# Patient Record
Sex: FEMALE | Race: WHITE | NOT HISPANIC OR LATINO | Employment: OTHER | URBAN - NONMETROPOLITAN AREA
[De-identification: names, ages, dates, MRNs, and addresses within clinical notes are randomized per-mention and may not be internally consistent; named-entity substitution may affect disease eponyms.]

---

## 2017-01-19 ENCOUNTER — OFFICE VISIT (OUTPATIENT)
Dept: GASTROENTEROLOGY | Facility: CLINIC | Age: 47
End: 2017-01-19

## 2017-01-19 VITALS
DIASTOLIC BLOOD PRESSURE: 71 MMHG | BODY MASS INDEX: 21.79 KG/M2 | WEIGHT: 111 LBS | HEIGHT: 60 IN | SYSTOLIC BLOOD PRESSURE: 96 MMHG | HEART RATE: 76 BPM

## 2017-01-19 DIAGNOSIS — R10.32 LEFT LOWER QUADRANT PAIN: ICD-10-CM

## 2017-01-19 DIAGNOSIS — R10.814 LEFT LOWER QUADRANT ABDOMINAL TENDERNESS WITHOUT REBOUND TENDERNESS: ICD-10-CM

## 2017-01-19 DIAGNOSIS — K21.9 GASTROESOPHAGEAL REFLUX DISEASE, ESOPHAGITIS PRESENCE NOT SPECIFIED: Primary | ICD-10-CM

## 2017-01-19 DIAGNOSIS — R12 HEARTBURN: ICD-10-CM

## 2017-01-19 DIAGNOSIS — R10.31 RIGHT LOWER QUADRANT ABDOMINAL PAIN: ICD-10-CM

## 2017-01-19 DIAGNOSIS — R10.813 RIGHT LOWER QUADRANT ABDOMINAL TENDERNESS WITHOUT REBOUND TENDERNESS: ICD-10-CM

## 2017-01-19 DIAGNOSIS — R19.7 DIARRHEA, UNSPECIFIED TYPE: ICD-10-CM

## 2017-01-19 PROCEDURE — 99204 OFFICE O/P NEW MOD 45 MIN: CPT | Performed by: INTERNAL MEDICINE

## 2017-01-19 RX ORDER — ESCITALOPRAM OXALATE 20 MG/1
TABLET ORAL
Refills: 1 | COMMUNITY
Start: 2017-01-13

## 2017-01-19 RX ORDER — OMEPRAZOLE 40 MG/1
CAPSULE, DELAYED RELEASE ORAL
Qty: 30 CAPSULE | Refills: 5 | Status: SHIPPED | OUTPATIENT
Start: 2017-01-19 | End: 2017-02-23 | Stop reason: SINTOL

## 2017-01-19 RX ORDER — ASPIRIN 81 MG/1
81 TABLET ORAL DAILY
COMMUNITY

## 2017-01-19 RX ORDER — LEVOTHYROXINE SODIUM 0.12 MG/1
TABLET ORAL
Refills: 5 | COMMUNITY
Start: 2016-12-20

## 2017-01-19 RX ORDER — ONDANSETRON 4 MG/1
TABLET, FILM COATED ORAL
Qty: 5 TABLET | Refills: 0 | Status: SHIPPED | OUTPATIENT
Start: 2017-01-19 | End: 2017-02-23

## 2017-01-19 RX ORDER — POLYETHYLENE GLYCOL 3350 17 G/17G
POWDER, FOR SOLUTION ORAL
Qty: 510 G | Refills: 0 | Status: SHIPPED | OUTPATIENT
Start: 2017-01-19

## 2017-01-19 RX ORDER — QUETIAPINE FUMARATE 50 MG/1
TABLET, FILM COATED ORAL
Refills: 1 | COMMUNITY
Start: 2016-11-21

## 2017-01-19 RX ORDER — CLONAZEPAM 0.5 MG/1
TABLET ORAL
Refills: 1 | COMMUNITY
Start: 2016-12-28

## 2017-01-19 RX ORDER — RANITIDINE 300 MG/1
TABLET ORAL
Refills: 5 | COMMUNITY
Start: 2016-12-20

## 2017-01-19 RX ORDER — ROSUVASTATIN CALCIUM 20 MG/1
TABLET, COATED ORAL
Refills: 5 | COMMUNITY
Start: 2016-12-20

## 2017-01-19 NOTE — PATIENT INSTRUCTIONS
LANDEN MCDONOUGH D.O.      UPPER ENDOSCOPY INSTRUCTIONS     ON THE DAY OF EXAMINATION:  1.) Do not eat anything after MIDNIGHT the night before.  2.)  You are permitted to have clear liquids until FOUR hours prior to procedure.  3.) You MUST have a  to transport you the day of the procedure.  4.) If you take medicine prescribed by your primary care physician, you may take your blood pressure medication with a small amount of water prior to your procedure. Bring your morning medication with you and you may take them after you have completed the procedure.   5.) Do not wear lipstick, earrings, hair sprays, or hair oils on the day of the exam.     POST PROCEDURE INSTRUCTIONS:  1.) If you experience any of the following symptoms:   • Abdominal Pain  • Fever  • Vomiting  • Blood from rectum  • Chest Pain    Please contact the office immediately at [phone]. If it after normal business hours contact the  at [facility] at [phone], and they will contact Dr. Mcdonough.     The procedure findings will be reviewed and discussed during your follow-up visit.            LANDEN MCDONOUGH D.O.  Board Certified in Gastroenterology    Miralax Colonoscopy One Day Prep    Do these things 7 DAYS BEFORE the procedure:  • Arrange a ride: You will be given medicine that makes you relax and be sleepy, so you cannot drive a car or take a bus home. If you arrive without an escort, your procedure may need to be rescheduled.   • Stop taking Iron and Vitamin E  • If you are taking Coumadin, Plavix, and/or Warfarin or if you are a diabetic, call your doctor for special instructions.  • Do not eat any seeds, popcorn or nuts.  • Please inform the physician of any history of heart murmurs, valve replacement, heart or lung conditions, or if you have had any adverse reactions to anesthesia.    Do these things 3 DAYS BEFORE the procedure:  • Confirm your ride.  • If you need to cancel your appointment,  call your doctor.   • Review the diet you need to follow for the next two days. Plan your meals according to the clear liquid diet.    Do these things 1 DAY BEFORE the procedure:  • Beginning at breakfast and lasting until midnight, start a clear liquid diet, a clear liquid diet can be found below.  • It is very important that you increase your fluids throughout the day.  • At approximately 8:00 AM take 4 Dulcolax (bisacodyl) tablets with 8 ounces of water.   • 8:00 PM - Mix 32 ounces of a pre-warmed liquid of your choice, such as lemon flavored Crystal Light, apple juice, or Gatorade (except red, blue, green, or purple) with 15 capfuls of MiraLax. Mix well until the MiraLax has dissolved. DO NOT REFRIGERATE (refrigeration will cause the MiraLax to not dissolve completely.) You may pour each glass over ice after the MiraLax has been dissolved if you prefer to drink it cold. Drink entire mixture in 1-3 hours.     Do these things ON THE DAY OF the procedure:   • 6 hours prior to arrival mix 15 capfuls with 32 ounces of liquid and drink entire mixture in 1-3 hours.  • You may take any essential medications with a small amount of water, otherwise do not eat or drink anything on the morning of your procedure. Please contact your Primary Care Physician for the insulin dose, if applicable.  • Once again, it is very important for you to bring someone with you at the time of your procedure to drive you home.  • Nothing to eat or drink 4 hours prior to procedure.    **REMINDERS**  **Please note: If you experience nausea, you may take Phenergan and/or Zofran. 1 tablet every 4-6 hours as needed.   ** Before presenting for your EGD or Colonoscopy you will need to have a shower/bath.         Clear Liquid Diet  This diet provides fluids that leave little residue and are easily absorbed with minimal digestive activity. This diet is inadequate in all essential nutrients and is recommended only if clear liquids are temporarily  needed. No red, purple, blue or green liquids should be consumed.    FOOD GROUP FOODS ALLOWED FOODS TO AVOID   Milk & Beverages  No red or purple liquids! Tea, coffee, carbonated or fruit flavored drinks Milk, milk drinks   Meat & Meat substances None All   Vegetables None All   Fruits & Fruit Juices Strained fruit juices: apple, white grape, lemonade Fruit juices with fruit pulp   Grains & Starches None All   Soups Clear broth, consommé, Beef, chicken, vegetable broth All others   Desserts Clear flavored gelatin or popsicles. No red, purple, blue and green.  All others   Fats None All   Miscellaneous Sugar, honey, syrup, clear hard candy, salt All others       Breakfast Lunch Dinner   4 oz White grape juice 4 oz Apple juice 4 oz Lemonade   6 oz clear broth 6 oz Clear broth 6 oz Clear Broth   Jell-O* Jell-O* Jell-O*   Tea or Coffee Tea or Coffee Tea or Coffee   *Plain only, no fruit or toppings.    Date of procedure: 02/01/2017                Time of Arrival: 1000 AM

## 2017-01-19 NOTE — MR AVS SNAPSHOT
Anayeli Dang   1/19/2017 4:10 PM   Office Visit    Dept Phone:  [phone]   Encounter #:  [id number]    Provider:  Caroline Mcdonough DO   Department:  [facility] GASTROENTEROLOGY                Your Full Care Plan              Today's Medication Changes          These changes are accurate as of: 1/19/17  5:29 PM.  If you have any questions, ask your nurse or doctor.               New Medication(s)Ordered:     bisacodyl 5 MG EC tablet   Commonly known as:  DULCOLAX   Take 4 tablets at 8am with a full glass of water.   Started by:  Caroline Mcdonough, DO       omeprazole 40 MG capsule   Commonly known as:  priLOSEC   Take 40mg tab PO once daily with a meal.   Started by:  Caroline Mcdonough, DO       ondansetron 4 MG tablet   Commonly known as:  ZOFRAN   Take 1 tablet PRN nausea every 4 hours.   Started by:  Caroline Mcdonough,        polyethylene glycol packet   Commonly known as:  MIRALAX   Take 255g of Miralax with 32 oz clear liquid at 8pm the night before the procedure. Repeat 255g Miralax 6 hrs prior to your procedure.   Started by:  Caroline Mcdonough DO            Where to Get Your Medications      [facility] [place] [facility] [address] - [phone]  - [phone] FX  [address], [place] [address]     Phone:  [phone]     bisacodyl 5 MG EC tablet    omeprazole 40 MG capsule    ondansetron 4 MG tablet    polyethylene glycol packet                  Your Updated Medication List          This list is accurate as of: 1/19/17  5:29 PM.  Always use your most recent med list.                aspirin 81 MG EC tablet       bisacodyl 5 MG EC tablet   Commonly known as:  DULCOLAX   Take 4 tablets at 8am with a full glass of water.       clonazePAM 0.5 MG tablet   Commonly known as:  KlonoPIN       escitalopram 20 MG tablet   Commonly known as:  LEXAPRO       levothyroxine 125 MCG  tablet   Commonly known as:  SYNTHROID, LEVOTHROID       omeprazole 40 MG capsule   Commonly known as:  priLOSEC   Take 40mg tab PO once daily with a meal.       ondansetron 4 MG tablet   Commonly known as:  ZOFRAN   Take 1 tablet PRN nausea every 4 hours.       polyethylene glycol packet   Commonly known as:  MIRALAX   Take 255g of Miralax with 32 oz clear liquid at 8pm the night before the procedure. Repeat 255g Miralax 6 hrs prior to your procedure.       QUEtiapine 50 MG tablet   Commonly known as:  SEROquel       raNITIdine 300 MG tablet   Commonly known as:  ZANTAC       rosuvastatin 20 MG tablet   Commonly known as:  CRESTOR               Instructions                 LANDEN MCDONOUGH D.O.      UPPER ENDOSCOPY INSTRUCTIONS     ON THE DAY OF EXAMINATION:  1.) Do not eat anything after MIDNIGHT the night before.  2.)  You are permitted to have clear liquids until FOUR hours prior to procedure.  3.) You MUST have a  to transport you the day of the procedure.  4.) If you take medicine prescribed by your primary care physician, you may take your blood pressure medication with a small amount of water prior to your procedure. Bring your morning medication with you and you may take them after you have completed the procedure.   5.) Do not wear lipstick, earrings, hair sprays, or hair oils on the day of the exam.     POST PROCEDURE INSTRUCTIONS:  1.) If you experience any of the following symptoms:   • Abdominal Pain  • Fever  • Vomiting  • Blood from rectum  • Chest Pain    Please contact the office immediately at [phone]. If it after normal business hours contact the  at [facility] at [phone], and they will contact Dr. Mcdonough.     The procedure findings will be reviewed and discussed during your follow-up visit.            LANDEN MCDONOUGH D.O.  Board Certified in Gastroenterology    Miralax Colonoscopy One Day Prep    Do these things 7 DAYS BEFORE the procedure:  • Arrange a  ride: You will be given medicine that makes you relax and be sleepy, so you cannot drive a car or take a bus home. If you arrive without an escort, your procedure may need to be rescheduled.   • Stop taking Iron and Vitamin E  • If you are taking Coumadin, Plavix, and/or Warfarin or if you are a diabetic, call your doctor for special instructions.  • Do not eat any seeds, popcorn or nuts.  • Please inform the physician of any history of heart murmurs, valve replacement, heart or lung conditions, or if you have had any adverse reactions to anesthesia.    Do these things 3 DAYS BEFORE the procedure:  • Confirm your ride.  • If you need to cancel your appointment, call your doctor.   • Review the diet you need to follow for the next two days. Plan your meals according to the clear liquid diet.    Do these things 1 DAY BEFORE the procedure:  • Beginning at breakfast and lasting until midnight, start a clear liquid diet, a clear liquid diet can be found below.  • It is very important that you increase your fluids throughout the day.  • At approximately 8:00 AM take 4 Dulcolax (bisacodyl) tablets with 8 ounces of water.   • 8:00 PM - Mix 32 ounces of a pre-warmed liquid of your choice, such as lemon flavored Crystal Light, apple juice, or Gatorade (except red, blue, green, or purple) with 15 capfuls of MiraLax. Mix well until the MiraLax has dissolved. DO NOT REFRIGERATE (refrigeration will cause the MiraLax to not dissolve completely.) You may pour each glass over ice after the MiraLax has been dissolved if you prefer to drink it cold. Drink entire mixture in 1-3 hours.     Do these things ON THE DAY OF the procedure:   • 6 hours prior to arrival mix 15 capfuls with 32 ounces of liquid and drink entire mixture in 1-3 hours.  • You may take any essential medications with a small amount of water, otherwise do not eat or drink anything on the morning of your procedure. Please contact your Primary Care Physician for the  insulin dose, if applicable.  • Once again, it is very important for you to bring someone with you at the time of your procedure to drive you home.  • Nothing to eat or drink 4 hours prior to procedure.    **REMINDERS**  **Please note: If you experience nausea, you may take Phenergan and/or Zofran. 1 tablet every 4-6 hours as needed.   ** Before presenting for your EGD or Colonoscopy you will need to have a shower/bath.         Clear Liquid Diet  This diet provides fluids that leave little residue and are easily absorbed with minimal digestive activity. This diet is inadequate in all essential nutrients and is recommended only if clear liquids are temporarily needed. No red, purple, blue or green liquids should be consumed.    FOOD GROUP FOODS ALLOWED FOODS TO AVOID   Milk & Beverages  No red or purple liquids! Tea, coffee, carbonated or fruit flavored drinks Milk, milk drinks   Meat & Meat substances None All   Vegetables None All   Fruits & Fruit Juices Strained fruit juices: apple, white grape, lemonade Fruit juices with fruit pulp   Grains & Starches None All   Soups Clear broth, consommé, Beef, chicken, vegetable broth All others   Desserts Clear flavored gelatin or popsicles. No red, purple, blue and green.  All others   Fats None All   Miscellaneous Sugar, honey, syrup, clear hard candy, salt All others       Breakfast Lunch Dinner   4 oz White grape juice 4 oz Apple juice 4 oz Lemonade   6 oz clear broth 6 oz Clear broth 6 oz Clear Broth   Jell-O* Jell-O* Jell-O*   Tea or Coffee Tea or Coffee Tea or Coffee   *Plain only, no fruit or toppings.    Date of procedure: 02/01/2017                Time of Arrival: 1000 AM         Patient Instructions History      Upcoming Appointments     Visit Type Date Time Department    NEW PATIENT 1/19/2017  4:10 PM [facility]    FOLLOW UP 2/23/2017  2:40 PM [facility]      MyChart Signup     [facility] allows you to send messages to your doctor,  "view your test results, renew your prescriptions, schedule appointments, and more. To sign up, go to [employer] and click on the Sign Up Now link in the New User? box. Enter your [employer] Activation Code exactly as it appears below along with the last four digits of your Social Security Number and your Date of Birth () to complete the sign-up process. If you do not sign up before the expiration date, you must request a new code.    [employer] Activation Code: [email or web address]  Expires: 2017  5:29 PM    If you have questions, you can email [email or web address] or call [phone] to talk to our [employer] staff. Remember, [employer] is NOT to be used for urgent needs. For medical emergencies, dial 911.               Other Info from Your Visit           Your Appointments     2017  2:40 PM EST   Follow Up with Becky Baires PA-C   [facility] (--)    [address] [place] [id number] [phone]           Arrive 15 minutes prior to appointment.              Allergies     No Known Allergies      Reason for Visit     Abdominal Pain           Vital Signs     Blood Pressure Pulse Height Weight Body Mass Index Smoking Status    96/71 76 60\" (152.4 cm) 111 lb (50.3 kg) 21.68 kg/m2 Current Every Day Smoker        "

## 2017-01-19 NOTE — PROGRESS NOTES
: 1970    Chief Complaint   Patient presents with   • Abdominal Pain       Anayeli Dang is a 46 y.o. female who presents to the office today as a consultation from Lexx Willson MD for evaluation of Abdominal Pain    History of Present Illness:    Ms. Anayeli Dang is a 46-year-old female who was seen in the office today in consultation at the request of Dr. Pepe Willson for evaluation of abdominal pain.  She reports that she has right lower quadrant abdominal pain that occurs daily and will occur in the evenings after eating even if it only Jell-O.  She rarely has pain in the morning.  She describes the pain as mild to moderate.  She denies any radiation the pain.  On 2016 she was seen in the emergency department for right upper quadrant abdominal pain.  Laboratory values were reviewed; CMP revealed BUN 20, lipase 209, CBC within normal limits urinalysis,  2+ leukocyte esterase.  CT abdomen and pelvis on 2016, revealed no acute abnormalities.  Stable prominence of the intrahepatic biliary system.  Nonobstructive right renal calculus.  She was treated with antibiotics for her urinary tract infection.  Thought she was advised to take all of her antibiotics at once.  After taking all of the antibiotics at one time she became sick and vomited. On , she had nausea with vomiting and copious diarrhea.  She thought she had a viral infection.  The nausea and vomiting have resolved.  She continues to have problems with abdominal pain.  She continues to have loose to diarrhea-like stools that vary in number from day to day.    She has never had a colonoscopy.  Family history reveals her father had colon cancer.    Review of Systems   Constitutional: Negative for appetite change, chills, fatigue, fever and unexpected weight change.   HENT: Negative for hearing loss, mouth sores and nosebleeds.    Eyes: Negative for itching and visual disturbance.   Respiratory: Negative for cough, chest  tightness, shortness of breath and wheezing.    Cardiovascular: Negative for chest pain, palpitations and leg swelling.   Endocrine: Negative for cold intolerance, heat intolerance, polydipsia and polyuria.   Genitourinary: Negative for dysuria, frequency and hematuria.   Musculoskeletal: Negative for arthralgias, joint swelling and myalgias.   Skin: Negative for rash and wound.   Allergic/Immunologic: Negative for food allergies and immunocompromised state.   Neurological: Negative for seizures, syncope, weakness and light-headedness.   Hematological: Negative for adenopathy. Does not bruise/bleed easily.   Psychiatric/Behavioral: Negative for confusion and sleep disturbance. The patient is nervous/anxious.    Gastrointestinal: Positive for abdominal pain, diarrhea and heartburn.    Past Medical History   Diagnosis Date   • Anxiety    • GERD (gastroesophageal reflux disease)    • Hyperlipidemia    • Thyroid disease        Past Surgical History   Procedure Laterality Date   • Breast surgery         Family History   Problem Relation Age of Onset   • Colon cancer Father    • No Known Problems Mother    • Heart attack Maternal Grandfather    • Cancer Paternal Grandmother    • Liver disease Neg Hx        History   Smoking Status   • Current Every Day Smoker   • Packs/day: 0.50   • Types: Cigarettes   Smokeless Tobacco   • Not on file     History   Alcohol Use No     History   Drug Use No     Marital Status: Legally       Current Outpatient Prescriptions:   •  aspirin 81 MG EC tablet, Take 81 mg by mouth Daily., Disp: , Rfl:   •  bisacodyl (DULCOLAX) 5 MG EC tablet, Take 4 tablets at 8am with a full glass of water., Disp: 4 tablet, Rfl: 0  •  clonazePAM (KlonoPIN) 0.5 MG tablet, , Disp: , Rfl: 1  •  escitalopram (LEXAPRO) 20 MG tablet, , Disp: , Rfl: 1  •  levothyroxine (SYNTHROID, LEVOTHROID) 125 MCG tablet, , Disp: , Rfl: 5  •  omeprazole (priLOSEC) 40 MG capsule, Take 40mg tab PO once daily with a meal.,  "Disp: 30 capsule, Rfl: 5  •  ondansetron (ZOFRAN) 4 MG tablet, Take 1 tablet PRN nausea every 4 hours., Disp: 5 tablet, Rfl: 0  •  polyethylene glycol (MIRALAX) packet, Take 255g of Miralax with 32 oz clear liquid at 8pm the night before the procedure. Repeat 255g Miralax 6 hrs prior to your procedure., Disp: 510 g, Rfl: 0  •  QUEtiapine (SEROquel) 50 MG tablet, , Disp: , Rfl: 1  •  raNITIdine (ZANTAC) 300 MG tablet, , Disp: , Rfl: 5  •  rosuvastatin (CRESTOR) 20 MG tablet, , Disp: , Rfl: 5    Allergies:   Review of patient's allergies indicates no known allergies.    Visit Vitals   • BP 96/71   • Pulse 76   • Ht 60\" (152.4 cm)   • Wt 111 lb (50.3 kg)   • BMI 21.68 kg/m2       Physical Exam   Constitutional: She is oriented to person, place, and time. She appears well-developed and well-nourished. No distress.   HENT:   Head: Normocephalic and atraumatic.   Right Ear: External ear normal.   Left Ear: External ear normal.   Nose: Nose normal.   Mouth/Throat: Oropharynx is clear and moist.   Eyes: Conjunctivae and EOM are normal. Right eye exhibits no discharge. Left eye exhibits no discharge. No scleral icterus.   Neck: Normal range of motion. Neck supple.   Cardiovascular: Normal rate, regular rhythm and normal heart sounds.  Exam reveals no gallop and no friction rub.    No murmur heard.  Pulmonary/Chest: Effort normal and breath sounds normal. No respiratory distress. She has no wheezes. She has no rales. She exhibits no tenderness.   Abdominal: Soft. Normal appearance and bowel sounds are normal. She exhibits no distension, no ascites and no mass. There is tenderness in the right lower quadrant and left lower quadrant. There is no rigidity and no guarding. No hernia.   Musculoskeletal: Normal range of motion. She exhibits no edema or deformity.   Neurological: She is alert and oriented to person, place, and time. She exhibits normal muscle tone. Coordination normal.   Skin: Skin is warm and dry. No rash noted. No " erythema. No pallor.        Psychiatric: She has a normal mood and affect. Her behavior is normal. Judgment and thought content normal.   Nursing note and vitals reviewed.      Assessment:  1. Gastroesophageal reflux disease, esophagitis presence not specified    2. Heartburn    3. Right lower quadrant abdominal pain    4. Left lower quadrant pain    5. Diarrhea, unspecified type    6. Right lower quadrant abdominal tenderness without rebound tenderness    7. Left lower quadrant abdominal tenderness without rebound tenderness        Plan:  · She will need an esophagogastroduodenoscopy and colonoscopy performed with IV general sedation. All of the risks, benefits and alternatives of these procedures have been discussed with her, all of her questions have been answered and she has elected to proceed. She should follow up in the office after these procedures to discuss the results and further recommendations can be made at that time.  · She was instructed not to lie down immediately after eating (wait at least 3 hours after meals), elevate the head of the bed at night, avoid spicy foods, avoid mints, avoid caffeine, avoid nicotine and maintain a healthy weight. She will start taking omeprazole 40mg once daily 30 minutes before meals.   · She will continue current medications.  · I discussed the patient's findings and my recommendations with the patient. All of their questions were answered to their satisfaction and they understand the plan.   · She will call with any interval concerns.       Return for next scheduled follow up after procedure.            Electronically signed by: Caroline Mcdonough D.O. 1/19/2017 at 4:30 PM.

## 2017-01-19 NOTE — LETTER
2017     Lexx Willson MD  [address]  [address]  [place] [address]    Patient: Anayeli Dang   YOB: 1970   Date of Visit: 2017       Dear Dr. Anamika MD:    Anayeli Dang was in my office today. Below is a copy of my note.    If you have questions, please do not hesitate to call me. I look forward to following Anayeli along with you.         Sincerely,        Caroline Mcdonough, DO        CC: No Recipients    : 1970    Chief Complaint   Patient presents with   • Abdominal Pain       Anayeli Dang is a 46 y.o. female who presents to the office today as a consultation from Lexx Willson MD for evaluation of Abdominal Pain    History of Present Illness:    Ms. Anayeli Dang is a 46-year-old female who was seen in the office today in consultation at the request of Dr. Pepe Willson for evaluation of abdominal pain.  She reports that she has right lower quadrant abdominal pain that occurs daily and will occur in the evenings after eating even if it only Jell-O.  She rarely has pain in the morning.  She describes the pain as mild to moderate.  She denies any radiation the pain.  On 2016 she was seen in the emergency department for right upper quadrant abdominal pain.  Laboratory values were reviewed; CMP revealed BUN 20, lipase 209, CBC within normal limits urinalysis,  2+ leukocyte esterase.  CT abdomen and pelvis on 2016, revealed no acute abnormalities.  Stable prominence of the intrahepatic biliary system.  Nonobstructive right renal calculus.  She was treated with antibiotics for her urinary tract infection.  Thought she was advised to take all of her antibiotics at once.  After taking all of the antibiotics at one time she became sick and vomited. On , she had nausea with vomiting and copious diarrhea.  She thought she had a viral infection.  The nausea and vomiting have resolved.  She continues to have problems with abdominal pain.  She  continues to have loose to diarrhea-like stools that vary in number from day to day.    She has never had a colonoscopy.  Family history reveals her father had colon cancer.    Review of Systems   Constitutional: Negative for appetite change, chills, fatigue, fever and unexpected weight change.   HENT: Negative for hearing loss, mouth sores and nosebleeds.    Eyes: Negative for itching and visual disturbance.   Respiratory: Negative for cough, chest tightness, shortness of breath and wheezing.    Cardiovascular: Negative for chest pain, palpitations and leg swelling.   Endocrine: Negative for cold intolerance, heat intolerance, polydipsia and polyuria.   Genitourinary: Negative for dysuria, frequency and hematuria.   Musculoskeletal: Negative for arthralgias, joint swelling and myalgias.   Skin: Negative for rash and wound.   Allergic/Immunologic: Negative for food allergies and immunocompromised state.   Neurological: Negative for seizures, syncope, weakness and light-headedness.   Hematological: Negative for adenopathy. Does not bruise/bleed easily.   Psychiatric/Behavioral: Negative for confusion and sleep disturbance. The patient is nervous/anxious.    Gastrointestinal: Positive for abdominal pain, diarrhea and heartburn.    Past Medical History   Diagnosis Date   • Anxiety    • GERD (gastroesophageal reflux disease)    • Hyperlipidemia    • Thyroid disease        Past Surgical History   Procedure Laterality Date   • Breast surgery         Family History   Problem Relation Age of Onset   • Colon cancer Father    • No Known Problems Mother    • Heart attack Maternal Grandfather    • Cancer Paternal Grandmother    • Liver disease Neg Hx        History   Smoking Status   • Current Every Day Smoker   • Packs/day: 0.50   • Types: Cigarettes   Smokeless Tobacco   • Not on file     History   Alcohol Use No     History   Drug Use No     Marital Status: Legally       Current Outpatient Prescriptions:   •   "aspirin 81 MG EC tablet, Take 81 mg by mouth Daily., Disp: , Rfl:   •  bisacodyl (DULCOLAX) 5 MG EC tablet, Take 4 tablets at 8am with a full glass of water., Disp: 4 tablet, Rfl: 0  •  clonazePAM (KlonoPIN) 0.5 MG tablet, , Disp: , Rfl: 1  •  escitalopram (LEXAPRO) 20 MG tablet, , Disp: , Rfl: 1  •  levothyroxine (SYNTHROID, LEVOTHROID) 125 MCG tablet, , Disp: , Rfl: 5  •  omeprazole (priLOSEC) 40 MG capsule, Take 40mg tab PO once daily with a meal., Disp: 30 capsule, Rfl: 5  •  ondansetron (ZOFRAN) 4 MG tablet, Take 1 tablet PRN nausea every 4 hours., Disp: 5 tablet, Rfl: 0  •  polyethylene glycol (MIRALAX) packet, Take 255g of Miralax with 32 oz clear liquid at 8pm the night before the procedure. Repeat 255g Miralax 6 hrs prior to your procedure., Disp: 510 g, Rfl: 0  •  QUEtiapine (SEROquel) 50 MG tablet, , Disp: , Rfl: 1  •  raNITIdine (ZANTAC) 300 MG tablet, , Disp: , Rfl: 5  •  rosuvastatin (CRESTOR) 20 MG tablet, , Disp: , Rfl: 5    Allergies:   Review of patient's allergies indicates no known allergies.    Visit Vitals   • BP 96/71   • Pulse 76   • Ht 60\" (152.4 cm)   • Wt 111 lb (50.3 kg)   • BMI 21.68 kg/m2       Physical Exam   Constitutional: She is oriented to person, place, and time. She appears well-developed and well-nourished. No distress.   HENT:   Head: Normocephalic and atraumatic.   Right Ear: External ear normal.   Left Ear: External ear normal.   Nose: Nose normal.   Mouth/Throat: Oropharynx is clear and moist.   Eyes: Conjunctivae and EOM are normal. Right eye exhibits no discharge. Left eye exhibits no discharge. No scleral icterus.   Neck: Normal range of motion. Neck supple.   Cardiovascular: Normal rate, regular rhythm and normal heart sounds.  Exam reveals no gallop and no friction rub.    No murmur heard.  Pulmonary/Chest: Effort normal and breath sounds normal. No respiratory distress. She has no wheezes. She has no rales. She exhibits no tenderness.   Abdominal: Soft. Normal " appearance and bowel sounds are normal. She exhibits no distension, no ascites and no mass. There is tenderness in the right lower quadrant and left lower quadrant. There is no rigidity and no guarding. No hernia.   Musculoskeletal: Normal range of motion. She exhibits no edema or deformity.   Neurological: She is alert and oriented to person, place, and time. She exhibits normal muscle tone. Coordination normal.   Skin: Skin is warm and dry. No rash noted. No erythema. No pallor.        Psychiatric: She has a normal mood and affect. Her behavior is normal. Judgment and thought content normal.   Nursing note and vitals reviewed.      Assessment:  1. Gastroesophageal reflux disease, esophagitis presence not specified    2. Heartburn    3. Right lower quadrant abdominal pain    4. Left lower quadrant pain    5. Diarrhea, unspecified type    6. Right lower quadrant abdominal tenderness without rebound tenderness    7. Left lower quadrant abdominal tenderness without rebound tenderness        Plan:  · She will need an esophagogastroduodenoscopy and colonoscopy performed with IV general sedation. All of the risks, benefits and alternatives of these procedures have been discussed with her, all of her questions have been answered and she has elected to proceed. She should follow up in the office after these procedures to discuss the results and further recommendations can be made at that time.  · She was instructed not to lie down immediately after eating (wait at least 3 hours after meals), elevate the head of the bed at night, avoid spicy foods, avoid mints, avoid caffeine, avoid nicotine and maintain a healthy weight. She will start taking omeprazole 40mg once daily 30 minutes before meals.   · She will continue current medications.  · I discussed the patient's findings and my recommendations with the patient. All of their questions were answered to their satisfaction and they understand the plan.   · She will call with  any interval concerns.       Return for next scheduled follow up after procedure.            Electronically signed by: Caroline Mcdonough D.O. 1/19/2017 at 4:30 PM.

## 2017-02-01 ENCOUNTER — ANESTHESIA (OUTPATIENT)
Dept: PERIOP | Facility: HOSPITAL | Age: 47
End: 2017-02-01

## 2017-02-01 ENCOUNTER — ANESTHESIA EVENT (OUTPATIENT)
Dept: PERIOP | Facility: HOSPITAL | Age: 47
End: 2017-02-01

## 2017-02-01 ENCOUNTER — HOSPITAL ENCOUNTER (OUTPATIENT)
Facility: HOSPITAL | Age: 47
Setting detail: HOSPITAL OUTPATIENT SURGERY
Discharge: HOME | End: 2017-02-01
Attending: INTERNAL MEDICINE | Admitting: INTERNAL MEDICINE

## 2017-02-01 VITALS
OXYGEN SATURATION: 95 % | HEIGHT: 60 IN | TEMPERATURE: 97.1 F | DIASTOLIC BLOOD PRESSURE: 77 MMHG | WEIGHT: 111 LBS | RESPIRATION RATE: 20 BRPM | SYSTOLIC BLOOD PRESSURE: 105 MMHG | HEART RATE: 81 BPM | BODY MASS INDEX: 21.79 KG/M2

## 2017-02-01 DIAGNOSIS — K21.9 GASTROESOPHAGEAL REFLUX DISEASE, ESOPHAGITIS PRESENCE NOT SPECIFIED: ICD-10-CM

## 2017-02-01 DIAGNOSIS — R12 HEARTBURN: ICD-10-CM

## 2017-02-01 DIAGNOSIS — R10.12 LEFT UPPER QUADRANT PAIN: ICD-10-CM

## 2017-02-01 DIAGNOSIS — R13.10 DYSPHAGIA, UNSPECIFIED TYPE: ICD-10-CM

## 2017-02-01 DIAGNOSIS — R63.0 ANOREXIA: ICD-10-CM

## 2017-02-01 PROCEDURE — 25010000002 PROPOFOL 1000 MG/ML EMULSION: Performed by: NURSE ANESTHETIST, CERTIFIED REGISTERED

## 2017-02-01 PROCEDURE — 43239 EGD BIOPSY SINGLE/MULTIPLE: CPT | Performed by: INTERNAL MEDICINE

## 2017-02-01 PROCEDURE — 45380 COLONOSCOPY AND BIOPSY: CPT | Performed by: INTERNAL MEDICINE

## 2017-02-01 PROCEDURE — 25010000002 FENTANYL CITRATE (PF) 100 MCG/2ML SOLUTION: Performed by: NURSE ANESTHETIST, CERTIFIED REGISTERED

## 2017-02-01 PROCEDURE — 25010000002 MIDAZOLAM PER 1 MG: Performed by: NURSE ANESTHETIST, CERTIFIED REGISTERED

## 2017-02-01 PROCEDURE — 88305 TISSUE EXAM BY PATHOLOGIST: CPT | Performed by: INTERNAL MEDICINE

## 2017-02-01 RX ORDER — LIDOCAINE HYDROCHLORIDE 20 MG/ML
INJECTION, SOLUTION INFILTRATION; PERINEURAL AS NEEDED
Status: DISCONTINUED | OUTPATIENT
Start: 2017-02-01 | End: 2017-02-01 | Stop reason: SURG

## 2017-02-01 RX ORDER — SODIUM CHLORIDE, SODIUM LACTATE, POTASSIUM CHLORIDE, CALCIUM CHLORIDE 600; 310; 30; 20 MG/100ML; MG/100ML; MG/100ML; MG/100ML
125 INJECTION, SOLUTION INTRAVENOUS CONTINUOUS
Status: DISCONTINUED | OUTPATIENT
Start: 2017-02-01 | End: 2017-02-01 | Stop reason: HOSPADM

## 2017-02-01 RX ORDER — MEPERIDINE HYDROCHLORIDE 25 MG/ML
12.5 INJECTION INTRAMUSCULAR; INTRAVENOUS; SUBCUTANEOUS
Status: DISCONTINUED | OUTPATIENT
Start: 2017-02-01 | End: 2017-02-01 | Stop reason: HOSPADM

## 2017-02-01 RX ORDER — FENTANYL CITRATE 50 UG/ML
INJECTION, SOLUTION INTRAMUSCULAR; INTRAVENOUS AS NEEDED
Status: DISCONTINUED | OUTPATIENT
Start: 2017-02-01 | End: 2017-02-01 | Stop reason: SURG

## 2017-02-01 RX ORDER — SODIUM CHLORIDE 0.9 % (FLUSH) 0.9 %
1-10 SYRINGE (ML) INJECTION AS NEEDED
Status: DISCONTINUED | OUTPATIENT
Start: 2017-02-01 | End: 2017-02-01 | Stop reason: HOSPADM

## 2017-02-01 RX ORDER — OXYCODONE HYDROCHLORIDE AND ACETAMINOPHEN 5; 325 MG/1; MG/1
1 TABLET ORAL ONCE AS NEEDED
Status: DISCONTINUED | OUTPATIENT
Start: 2017-02-01 | End: 2017-02-01 | Stop reason: HOSPADM

## 2017-02-01 RX ORDER — ONDANSETRON 2 MG/ML
4 INJECTION INTRAMUSCULAR; INTRAVENOUS ONCE AS NEEDED
Status: DISCONTINUED | OUTPATIENT
Start: 2017-02-01 | End: 2017-02-01 | Stop reason: HOSPADM

## 2017-02-01 RX ORDER — MIDAZOLAM HYDROCHLORIDE 1 MG/ML
INJECTION INTRAMUSCULAR; INTRAVENOUS AS NEEDED
Status: DISCONTINUED | OUTPATIENT
Start: 2017-02-01 | End: 2017-02-01 | Stop reason: SURG

## 2017-02-01 RX ORDER — IPRATROPIUM BROMIDE AND ALBUTEROL SULFATE 2.5; .5 MG/3ML; MG/3ML
3 SOLUTION RESPIRATORY (INHALATION) ONCE AS NEEDED
Status: DISCONTINUED | OUTPATIENT
Start: 2017-02-01 | End: 2017-02-01 | Stop reason: HOSPADM

## 2017-02-01 RX ORDER — FENTANYL CITRATE 50 UG/ML
50 INJECTION, SOLUTION INTRAMUSCULAR; INTRAVENOUS
Status: DISCONTINUED | OUTPATIENT
Start: 2017-02-01 | End: 2017-02-01 | Stop reason: HOSPADM

## 2017-02-01 RX ADMIN — LIDOCAINE HYDROCHLORIDE 40 MG: 20 INJECTION, SOLUTION INFILTRATION; PERINEURAL at 10:34

## 2017-02-01 RX ADMIN — FENTANYL CITRATE 100 MCG: 50 INJECTION INTRAMUSCULAR; INTRAVENOUS at 10:33

## 2017-02-01 RX ADMIN — MIDAZOLAM HYDROCHLORIDE 2 MG: 1 INJECTION, SOLUTION INTRAMUSCULAR; INTRAVENOUS at 10:33

## 2017-02-01 RX ADMIN — SODIUM CHLORIDE, POTASSIUM CHLORIDE, SODIUM LACTATE AND CALCIUM CHLORIDE: 600; 310; 30; 20 INJECTION, SOLUTION INTRAVENOUS at 10:32

## 2017-02-01 RX ADMIN — EPHEDRINE SULFATE 10 MG: 50 INJECTION INTRAMUSCULAR; INTRAVENOUS; SUBCUTANEOUS at 10:47

## 2017-02-01 RX ADMIN — PROPOFOL 150 MCG/KG/MIN: 10 INJECTION, EMULSION INTRAVENOUS at 10:34

## 2017-02-01 RX ADMIN — EPHEDRINE SULFATE 10 MG: 50 INJECTION INTRAMUSCULAR; INTRAVENOUS; SUBCUTANEOUS at 10:50

## 2017-02-01 RX ADMIN — EPHEDRINE SULFATE 10 MG: 50 INJECTION INTRAMUSCULAR; INTRAVENOUS; SUBCUTANEOUS at 10:55

## 2017-02-01 NOTE — ANESTHESIA PREPROCEDURE EVALUATION
Anesthesia Evaluation      Airway   Mallampati: II  TM distance: >3 FB  Neck ROM: full  no difficulty expected  Dental    (+) edentulous    Pulmonary - normal exam   Cardiovascular - normal exam    Neuro/Psych  GI/Hepatic/Renal/Endo    (+)  GERD,     Musculoskeletal     Abdominal  - normal exam   Substance History      OB/GYN          Other                             Anesthesia Plan    ASA 2     general     intravenous induction   Anesthetic plan and risks discussed with patient.

## 2017-02-01 NOTE — OP NOTE
Operative Progress Note    Patient Name: Anayeli Dang  : 1970  Procedure Date: 2017    Surgeon: Caroline Mcdonough D.O.    Pre-operative Diagnosis:     Esophageal reflux disease  Heartburn  Right lower quadrant abdominal pain  Left lower quadrant abdominal pain  Diarrhea  Right lower quadrant abdominal tenderness  Left lower quadrant abdominal tenderness    Post-operative Diagnosis:     Hiatal hernia, moderate  Gastritis cold forceps biopsy the antrum  Esophagitis   Random colon biopsies rule out microscopic colitis  Rectal polyp removed with cold forceps    Procedure(s): Esophagogastroduodenoscopy with biopsy    Colonoscopy biopsy    Type of Anesthesia Administered: IV General    Estimated Blood Loss: Minimal    Blood Products: None    Specimen obtained/removed:     Antrum biopsy  Duodenal biopsy  Rectal polyp  Random colon biopsies    Complication(s): None    Graft/Implant/Prosthetics/Implanted Device/Transplants: N/A      Operative Note:  The patient presents for an esophagogastroduodenoscopy and colonoscopy with monitored IV general sedation.  Patient was advised of the risks, benefits and alternatives of the esophagogastroduodenoscopy and colonoscopy with monitored IV general sedation.  The patient elected to proceed.  Informed consent was obtained.  The patient was brought into the Endo suite and given incremental doses of IV general sedation.  Patient was placed in left lateral decubitus position.     Esophagogastroduodenoscopy findings:  After adequate sedation, the gastroscope was placed into the patient's mouth and advanced under direct visualization to the posterior oropharynx.  Scope was advanced into the esophagus, through the lower esophageal sphincter into the stomach and on into the third portion of the small bowel.  The small bowel increased inflammation and cold forceps biopsies were taken.  The scope was then brought back into the body of the stomach.  The antrum was closely examined  and there was increased erythema cold forceps biopsies were taken.  The scope was then retroflexed and fundus was normal.  Scope was straightened and brought to the distal esophagus.  The distal esophagus  revealed inflammation.  Scope was then withdrawn from the patient.  The patient tolerated the procedure well and was prepared for colonoscopy.     Colonoscopy findings: Rectal examination revealed no abnormal rectal tone.  The scope was then advanced into the rectum under direct visualization all the way to the cecum.  The appendiceal opening was seen and photographed.  The ileocecal valve was visualized.  The scope was then withdrawn in a circumferential manner throughout the ascending, transverse, descending, and rectosigmoid area.  The polyp was removed from the rectum with cold forceps.  Random biopsies were taken throughout the colon to rule out microscopic colitis.  In the rectosigmoid area, the scope was retroflexed with no significant findings.  The scope was straightened, air was suctioned.  The scope was then removed from the patient.  The patient tolerated the procedures well and was taken to the recovery room.    Quality of prep:  Adequate    Time of procedure: 10:39-11:04    Withdrawal time: 10:57-11:04    Discharge summary: The patient remained stable throughout the stay in PACU. There were no immediate complications. The patient was discharged home in stable condition.  Findings were discussed with the patient at the bedside.  The patient will advance their diet as tolerated.  The patient will have light activity for the next 24 hours. The patient will have a repeat colonoscopy in 5 years unless indicated sooner.  The patient will continue their current medications.  I have asked the patient to call with any interval concerns.  Thank you for allowing me to participate in the care of your patient.              Electronically signed by: Caroline Mcdonough D.O. 2/1/2017 at 11:11 AM

## 2017-02-02 ENCOUNTER — TELEPHONE (OUTPATIENT)
Dept: GASTROENTEROLOGY | Facility: CLINIC | Age: 47
End: 2017-02-02

## 2017-02-02 DIAGNOSIS — R19.7 DIARRHEA, UNSPECIFIED TYPE: ICD-10-CM

## 2017-02-02 DIAGNOSIS — K52.832 LYMPHOCYTIC COLITIS: Primary | ICD-10-CM

## 2017-02-02 LAB
LAB AP CASE REPORT: NORMAL
Lab: NORMAL
PATH REPORT.FINAL DX SPEC: NORMAL

## 2017-02-23 ENCOUNTER — OFFICE VISIT (OUTPATIENT)
Dept: GASTROENTEROLOGY | Facility: CLINIC | Age: 47
End: 2017-02-23

## 2017-02-23 VITALS
HEART RATE: 73 BPM | OXYGEN SATURATION: 95 % | DIASTOLIC BLOOD PRESSURE: 73 MMHG | HEIGHT: 60 IN | WEIGHT: 112 LBS | BODY MASS INDEX: 21.99 KG/M2 | SYSTOLIC BLOOD PRESSURE: 96 MMHG

## 2017-02-23 DIAGNOSIS — K29.50 OTHER CHRONIC GASTRITIS: ICD-10-CM

## 2017-02-23 DIAGNOSIS — K52.832 LYMPHOCYTIC COLITIS: ICD-10-CM

## 2017-02-23 DIAGNOSIS — D12.6 TUBULAR ADENOMA OF COLON: ICD-10-CM

## 2017-02-23 DIAGNOSIS — K22.70 BARRETT'S ESOPHAGUS WITHOUT DYSPLASIA: Primary | ICD-10-CM

## 2017-02-23 DIAGNOSIS — R10.814 LEFT LOWER QUADRANT ABDOMINAL TENDERNESS WITHOUT REBOUND TENDERNESS: ICD-10-CM

## 2017-02-23 DIAGNOSIS — Z80.0 FAMILY HISTORY OF COLON CANCER: ICD-10-CM

## 2017-02-23 PROCEDURE — 99214 OFFICE O/P EST MOD 30 MIN: CPT | Performed by: PHYSICIAN ASSISTANT

## 2017-02-23 RX ORDER — PANTOPRAZOLE SODIUM 20 MG/1
20 TABLET, DELAYED RELEASE ORAL DAILY
Qty: 30 TABLET | Refills: 5 | Status: SHIPPED | OUTPATIENT
Start: 2017-02-23 | End: 2017-08-21 | Stop reason: SDUPTHER

## 2017-02-28 ENCOUNTER — TELEPHONE (OUTPATIENT)
Dept: GASTROENTEROLOGY | Facility: CLINIC | Age: 47
End: 2017-02-28

## 2017-02-28 NOTE — TELEPHONE ENCOUNTER
Radha from the [employer] called checking on the status of the prior authorization on the Uceris. If we have any information please call Radha back at [phone].

## 2017-03-04 ENCOUNTER — TELEPHONE (OUTPATIENT)
Dept: GASTROENTEROLOGY | Facility: CLINIC | Age: 47
End: 2017-03-04

## 2017-03-05 NOTE — TELEPHONE ENCOUNTER
"The company [facility] working on the PA for Budesonide is being faxed to [employer] - please contact the Pharmacy to change the fax number.   "

## 2017-03-08 NOTE — TELEPHONE ENCOUNTER
I faxed in MIGUEL valdez on 03/02/17, I called Jennifer today, it was automated system, it said uceris was denied but did not give a reason, they will be faxing the denial to us. sk

## 2017-03-30 NOTE — TELEPHONE ENCOUNTER
Will they pay for either Entocort or Uceris-   Does she qualify for patient assistance for either medication?

## 2017-03-31 NOTE — TELEPHONE ENCOUNTER
Ok- I sent in an appeal for [facility] today, If they dont approve, I will check on patient assistance or see if they will pay for entocort

## 2017-05-01 ENCOUNTER — DOCUMENTATION (OUTPATIENT)
Dept: GASTROENTEROLOGY | Facility: CLINIC | Age: 47
End: 2017-05-01

## 2017-07-17 NOTE — ANESTHESIA POSTPROCEDURE EVALUATION
Consider gallbladder evaluation if no better Patient: Anayeli Dang    Procedure Summary     Date Anesthesia Start Anesthesia Stop Room / Location    02/01/17 1032 1108  [facility] 07 / [facility]       Procedure Diagnosis Surgeon Provider    ESOPHAGOGASTRODUODENOSCOPY WITH BIOPSY CPT CODE: 48924 (N/A Esophagus); COLONOSCOPY CPT CODE: 12073 (N/A ) Anorexia; Heartburn; Left upper quadrant pain; Gastroesophageal reflux disease, esophagitis presence not specified; Dysphagia, unspecified type  (Anorexia [R63.0]; Heartburn [R12]; Left upper quadrant pain [R10.12]; Gastroesophageal reflux disease, esophagitis presence not specified [K21.9]; Dysphagia, unspecified type [R13.10]) DO Rogelio Ortega MD          Anesthesia Type: general  Last vitals  BP (!) 88/73 (02/01/17 1120)    Temp 97.1 °F (36.2 °C) (02/01/17 1110)    Pulse 70 (02/01/17 1120)   Resp 18 (02/01/17 1120)    SpO2 96 % (02/01/17 1120)      Post Anesthesia Care and Evaluation    Patient location during evaluation: PHASE II  Patient participation: complete - patient participated  Level of consciousness: awake and alert  Pain score: 1  Pain management: adequate  Airway patency: patent  Anesthetic complications: No anesthetic complications  PONV Status: controlled  Cardiovascular status: acceptable  Respiratory status: acceptable  Hydration status: acceptable

## 2017-08-21 DIAGNOSIS — K29.50 OTHER CHRONIC GASTRITIS: ICD-10-CM

## 2017-08-21 DIAGNOSIS — K22.70 BARRETT'S ESOPHAGUS WITHOUT DYSPLASIA: ICD-10-CM

## 2017-08-21 RX ORDER — PANTOPRAZOLE SODIUM 20 MG/1
TABLET, DELAYED RELEASE ORAL
Qty: 30 TABLET | Refills: 5 | Status: SHIPPED | OUTPATIENT
Start: 2017-08-21 | End: 2018-01-18 | Stop reason: SDUPTHER

## 2017-10-24 ENCOUNTER — APPOINTMENT (OUTPATIENT)
Dept: WOMENS IMAGING | Facility: HOSPITAL | Age: 47
End: 2017-10-24

## 2017-10-24 PROCEDURE — 77063 BREAST TOMOSYNTHESIS BI: CPT | Performed by: RADIOLOGY

## 2017-10-24 PROCEDURE — 77067 SCR MAMMO BI INCL CAD: CPT | Performed by: RADIOLOGY

## 2018-01-18 DIAGNOSIS — K22.70 BARRETT'S ESOPHAGUS WITHOUT DYSPLASIA: ICD-10-CM

## 2018-01-18 RX ORDER — PANTOPRAZOLE SODIUM 20 MG/1
TABLET, DELAYED RELEASE ORAL
Qty: 30 TABLET | Refills: 5 | Status: SHIPPED | OUTPATIENT
Start: 2018-01-18 | End: 2018-07-20 | Stop reason: SDUPTHER

## 2018-07-20 DIAGNOSIS — K22.70 BARRETT'S ESOPHAGUS WITHOUT DYSPLASIA: ICD-10-CM

## 2018-07-20 RX ORDER — PANTOPRAZOLE SODIUM 20 MG/1
20 TABLET, DELAYED RELEASE ORAL DAILY
Qty: 30 TABLET | Refills: 5 | Status: SHIPPED | OUTPATIENT
Start: 2018-07-20 | End: 2018-08-20 | Stop reason: SDUPTHER

## 2018-07-20 RX ORDER — PANTOPRAZOLE SODIUM 20 MG/1
TABLET, DELAYED RELEASE ORAL
Qty: 30 TABLET | Refills: 5 | OUTPATIENT
Start: 2018-07-20

## 2018-08-18 DIAGNOSIS — K22.70 BARRETT'S ESOPHAGUS WITHOUT DYSPLASIA: ICD-10-CM

## 2018-08-20 DIAGNOSIS — K22.70 BARRETT'S ESOPHAGUS WITHOUT DYSPLASIA: ICD-10-CM

## 2018-08-20 RX ORDER — PANTOPRAZOLE SODIUM 20 MG/1
TABLET, DELAYED RELEASE ORAL
Qty: 30 TABLET | Refills: 5 | OUTPATIENT
Start: 2018-08-20

## 2018-08-20 RX ORDER — PANTOPRAZOLE SODIUM 20 MG/1
20 TABLET, DELAYED RELEASE ORAL DAILY
Qty: 30 TABLET | Refills: 5 | Status: SHIPPED | OUTPATIENT
Start: 2018-08-20

## 2019-01-02 ENCOUNTER — APPOINTMENT (OUTPATIENT)
Dept: WOMENS IMAGING | Facility: HOSPITAL | Age: 49
End: 2019-01-02

## 2019-01-02 PROCEDURE — 77063 BREAST TOMOSYNTHESIS BI: CPT | Performed by: RADIOLOGY

## 2019-01-02 PROCEDURE — 77067 SCR MAMMO BI INCL CAD: CPT | Performed by: RADIOLOGY

## 2020-01-06 ENCOUNTER — APPOINTMENT (OUTPATIENT)
Dept: WOMENS IMAGING | Facility: HOSPITAL | Age: 50
End: 2020-01-06

## 2020-01-06 PROCEDURE — 77067 SCR MAMMO BI INCL CAD: CPT | Performed by: RADIOLOGY

## 2020-01-06 PROCEDURE — 77063 BREAST TOMOSYNTHESIS BI: CPT | Performed by: RADIOLOGY

## 2021-03-10 ENCOUNTER — APPOINTMENT (OUTPATIENT)
Dept: WOMENS IMAGING | Facility: HOSPITAL | Age: 51
End: 2021-03-10

## 2021-03-10 PROCEDURE — 77063 BREAST TOMOSYNTHESIS BI: CPT | Performed by: RADIOLOGY

## 2021-03-10 PROCEDURE — 77067 SCR MAMMO BI INCL CAD: CPT | Performed by: RADIOLOGY

## 2022-02-22 DIAGNOSIS — Z12.11 ENCOUNTER FOR SCREENING FOR MALIGNANT NEOPLASM OF COLON: Primary | ICD-10-CM

## 2022-03-18 ENCOUNTER — TELEPHONE (OUTPATIENT)
Dept: GASTROENTEROLOGY | Facility: CLINIC | Age: 52
End: 2022-03-18

## 2022-05-05 ENCOUNTER — APPOINTMENT (OUTPATIENT)
Dept: WOMENS IMAGING | Facility: HOSPITAL | Age: 52
End: 2022-05-05

## 2022-05-05 PROCEDURE — 77063 BREAST TOMOSYNTHESIS BI: CPT | Performed by: RADIOLOGY

## 2022-05-05 PROCEDURE — 77067 SCR MAMMO BI INCL CAD: CPT | Performed by: RADIOLOGY

## (undated) DEVICE — FRCP BX RADJAW4 NDL 2.8 240CM LG OG BX40

## (undated) DEVICE — Device

## (undated) DEVICE — THE BITE BLOCK MAXI, LATEX FREE STRAP IS USED TO PROTECT THE ENDOSCOPE INSERTION TUBE FROM BEING BITTEN BY THE PATIENT.

## (undated) DEVICE — GOWN,REINF,POLY,ECL,PP SLV,XL: Brand: MEDLINE

## (undated) DEVICE — SYR LUERLOK 30CC

## (undated) DEVICE — Device: Brand: ENDOGATOR

## (undated) DEVICE — CONN Y IRR DISP 1P/U

## (undated) DEVICE — SINGLE PORT MANIFOLD: Brand: NEPTUNE 2

## (undated) DEVICE — Device: Brand: DEFENDO AIR/WATER/SUCTION AND BIOPSY VALVE

## (undated) DEVICE — TUBING, SUCTION, 1/4" X 20', STRAIGHT: Brand: MEDLINE INDUSTRIES, INC.